# Patient Record
Sex: MALE | Race: BLACK OR AFRICAN AMERICAN
[De-identification: names, ages, dates, MRNs, and addresses within clinical notes are randomized per-mention and may not be internally consistent; named-entity substitution may affect disease eponyms.]

---

## 2020-12-15 ENCOUNTER — HOSPITAL ENCOUNTER (EMERGENCY)
Dept: HOSPITAL 53 - M ED | Age: 35
Discharge: HOME | End: 2020-12-15
Payer: COMMERCIAL

## 2020-12-15 VITALS — WEIGHT: 201.41 LBS | BODY MASS INDEX: 27.28 KG/M2 | HEIGHT: 72 IN

## 2020-12-15 VITALS — DIASTOLIC BLOOD PRESSURE: 67 MMHG | SYSTOLIC BLOOD PRESSURE: 126 MMHG

## 2020-12-15 DIAGNOSIS — N20.0: Primary | ICD-10-CM

## 2020-12-15 LAB
ALBUMIN SERPL BCG-MCNC: 3.9 GM/DL (ref 3.2–5.2)
ALT SERPL W P-5'-P-CCNC: 26 U/L (ref 12–78)
BASOPHILS # BLD AUTO: 0 10^3/UL (ref 0–0.2)
BASOPHILS NFR BLD AUTO: 0.3 % (ref 0–1)
BILIRUB CONJ SERPL-MCNC: 0.1 MG/DL (ref 0–0.2)
BILIRUB SERPL-MCNC: 0.3 MG/DL (ref 0.2–1)
BUN SERPL-MCNC: 14 MG/DL (ref 7–18)
CALCIUM SERPL-MCNC: 9.5 MG/DL (ref 8.5–10.1)
CHLORIDE SERPL-SCNC: 107 MEQ/L (ref 98–107)
CO2 SERPL-SCNC: 30 MEQ/L (ref 21–32)
CREAT SERPL-MCNC: 1.58 MG/DL (ref 0.7–1.3)
EOSINOPHIL # BLD AUTO: 0 10^3/UL (ref 0–0.5)
EOSINOPHIL NFR BLD AUTO: 0.5 % (ref 0–3)
GFR SERPL CREATININE-BSD FRML MDRD: > 60 ML/MIN/{1.73_M2} (ref 60–?)
GLUCOSE SERPL-MCNC: 113 MG/DL (ref 70–100)
HCT VFR BLD AUTO: 46.6 % (ref 42–52)
HGB BLD-MCNC: 15.2 G/DL (ref 13.5–17.5)
LIPASE SERPL-CCNC: 54 U/L (ref 73–393)
LYMPHOCYTES # BLD AUTO: 1.2 10^3/UL (ref 1.5–5)
LYMPHOCYTES NFR BLD AUTO: 13.4 % (ref 24–44)
MCH RBC QN AUTO: 25.5 PG (ref 27–33)
MCHC RBC AUTO-ENTMCNC: 32.6 G/DL (ref 32–36.5)
MCV RBC AUTO: 78.3 FL (ref 80–96)
MONOCYTES # BLD AUTO: 0.5 10^3/UL (ref 0–0.8)
MONOCYTES NFR BLD AUTO: 5.3 % (ref 0–5)
NEUTROPHILS # BLD AUTO: 7 10^3/UL (ref 1.5–8.5)
NEUTROPHILS NFR BLD AUTO: 80.2 % (ref 36–66)
PLATELET # BLD AUTO: 291 10^3/UL (ref 150–450)
POTASSIUM SERPL-SCNC: 4.2 MEQ/L (ref 3.5–5.1)
PROT SERPL-MCNC: 7.4 GM/DL (ref 6.4–8.2)
RBC # BLD AUTO: 5.95 10^6/UL (ref 4.3–6.1)
SODIUM SERPL-SCNC: 142 MEQ/L (ref 136–145)
WBC # BLD AUTO: 8.7 10^3/UL (ref 4–10)

## 2020-12-15 PROCEDURE — 99284 EMERGENCY DEPT VISIT MOD MDM: CPT

## 2020-12-15 PROCEDURE — 96361 HYDRATE IV INFUSION ADD-ON: CPT

## 2020-12-15 PROCEDURE — 74176 CT ABD & PELVIS W/O CONTRAST: CPT

## 2020-12-15 PROCEDURE — 81001 URINALYSIS AUTO W/SCOPE: CPT

## 2020-12-15 PROCEDURE — 85025 COMPLETE CBC W/AUTO DIFF WBC: CPT

## 2020-12-15 PROCEDURE — 80048 BASIC METABOLIC PNL TOTAL CA: CPT

## 2020-12-15 PROCEDURE — 36415 COLL VENOUS BLD VENIPUNCTURE: CPT

## 2020-12-15 PROCEDURE — 83690 ASSAY OF LIPASE: CPT

## 2020-12-15 PROCEDURE — 96374 THER/PROPH/DIAG INJ IV PUSH: CPT

## 2020-12-15 PROCEDURE — 76870 US EXAM SCROTUM: CPT

## 2020-12-15 PROCEDURE — 80076 HEPATIC FUNCTION PANEL: CPT

## 2020-12-15 NOTE — REPVR
PROCEDURE INFORMATION: 

Exam: US Scrotum 

Exam date and time: 12/15/2020 6:48 PM 

Age: 35 years old 

Clinical indication: Flank pain; Additional info: Testicular pain R/O torsion 



TECHNIQUE: 

Imaging protocol: Real-time ultrasound of the scrotum and contents with color 

Doppler and image documentation. 



COMPARISON: 

No relevant prior studies available. 



FINDINGS:



Right testicle measures 4.0 x 2.2 x 2.6 cm in size.  

Right testicle appears homogeneous with no focal mass.

Normal Doppler flow is present within the right testicle.



Left testicle measures 4.4 x 1.7 x 2.3 cm in size. 

Left testicle appears homogeneous with no focal mass.  Incidental 

microcalcifications.

Normal Doppler flow is present within the left testicle.



Right and left epididymis are symmetric and have normal Doppler flow.  

Resistive index 0.72 on the right and 0.55 on the left



Physiologic fluid volume within the scrotal sac.



No bowel-containing hernia sac within the scrotum.



IMPRESSION:



Unremarkable scrotal ultrasound with Doppler.



Electronically signed by: Zia Obrien On 12/15/2020  19:01:37 PM

## 2020-12-15 NOTE — REPVR
PROCEDURE INFORMATION: 

Exam: CT Abdomen And Pelvis Without Contrast 

Exam date and time: 12/15/2020 5:52 PM 

Age: 35 years old 

Clinical indication: Abdominal pain; Flank; Left; Additional info: L flank pain 



TECHNIQUE: 

Imaging protocol: Computed tomography of the abdomen and pelvis without 

contrast. 

Radiation optimization: All CT scans at this facility use at least one of these 

dose optimization techniques: automated exposure control; mA and/or kV 

adjustment per patient size (includes targeted exams where dose is matched to 

clinical indication); or iterative reconstruction. 



COMPARISON: 

No relevant prior studies available. 



FINDINGS: 

Lungs: No suspicious mass or airspace process in the visualized lung bases. 



Liver: Liver is unremarkable aside from a 9 mm the simple fluid density cyst on 

image 32. Noncontrast spleen shows no obvious focal deformity. 

Gallbladder and bile ducts: Gallbladder is present and shows no evidence of 

gallstone. 

Pancreas: Noncontrast pancreas shows no obvious mass or adjacent fluid. 

Spleen: See "Liver" finding. 

Adrenal glands: Adrenal glands are normal in appearance. 

Kidneys and ureters: Kidneys show no stone. No ureter stone. Mild left renal 

collecting system and proximal ureter prominence 

Stomach and bowel: No evidence of small bowel obstruction. No evidence of acute 

diverticulitis. 

Appendix: Normal caliber appendix is identified, with no adjacent inflammation. 



Intraperitoneal space: No pneumoperitoneum. 

Vasculature: No aortic aneurysm. 

Lymph nodes: No enlarged lymph nodes. 

Urinary bladder: Urinary bladder appears normal aside from a dependent bladder 

lumen stone. 

Reproductive: No overt enlargement of the prostate gland. 

Bones/joints: Bony structures show no acute fracture or destructive process. 

Soft tissues: Fat containing umbilical hernia is present. No concerning focal 

abnormality of the extra-abdominal and pelvic soft tissues. 



Other findings: Limited evaluation without enteric or IV contrast. 



IMPRESSION: 

No evidence of renal stone . A punctate stone within the lumen of the bladder 

may have recently passed from the left ureter, given the mild left collecting 

system dilatation and periureteric stranding.. 



Electronically signed by: Zia Obrien On 12/15/2020  18:21:40 PM